# Patient Record
Sex: MALE | Race: WHITE
[De-identification: names, ages, dates, MRNs, and addresses within clinical notes are randomized per-mention and may not be internally consistent; named-entity substitution may affect disease eponyms.]

---

## 2018-06-09 ENCOUNTER — HOSPITAL ENCOUNTER (EMERGENCY)
Dept: HOSPITAL 17 - PHEFT | Age: 78
Discharge: HOME | End: 2018-06-09
Payer: MEDICARE

## 2018-06-09 VITALS
OXYGEN SATURATION: 97 % | RESPIRATION RATE: 18 BRPM | DIASTOLIC BLOOD PRESSURE: 78 MMHG | SYSTOLIC BLOOD PRESSURE: 140 MMHG | HEART RATE: 48 BPM

## 2018-06-09 VITALS — DIASTOLIC BLOOD PRESSURE: 78 MMHG | SYSTOLIC BLOOD PRESSURE: 134 MMHG

## 2018-06-09 VITALS — HEIGHT: 66 IN | BODY MASS INDEX: 22.36 KG/M2 | WEIGHT: 139.11 LBS

## 2018-06-09 VITALS
SYSTOLIC BLOOD PRESSURE: 150 MMHG | HEART RATE: 48 BPM | TEMPERATURE: 98.1 F | DIASTOLIC BLOOD PRESSURE: 114 MMHG | RESPIRATION RATE: 18 BRPM | OXYGEN SATURATION: 97 %

## 2018-06-09 DIAGNOSIS — I10: ICD-10-CM
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
## 2018-06-09 DIAGNOSIS — S05.01XA: Primary | ICD-10-CM

## 2018-06-09 DIAGNOSIS — H11.31: ICD-10-CM

## 2018-06-09 PROCEDURE — 99283 EMERGENCY DEPT VISIT LOW MDM: CPT

## 2018-06-09 NOTE — PD
HPI


Chief Complaint:  Eye Problems/Injury


Time Seen by Provider:  19:31


Travel History


International Travel<30 days:  No


Contact w/Intl Traveler<30days:  No


Traveled to known affect area:  No





History of Present Illness


HPI


78-year-old male presents to the emergency department with complaint of injury 

to his right eye from a branch that swung back, injuring his eye today.  

Reports redness to the lateral aspect.  Denies change in vision or eye pain.  

Denies eye drainage.  Has not taken any medications or try any treatments to 

alleviate his symptoms.  Symptoms are mild in severity.  No known aggravating 

or relieving factors.  No known allergies.  Primary CARE providers Dr. Lizarraga.  History of hypertension.  Has no other medical complaints.  No other 

modifying factors or associated signs and symptoms.





PFSH


Past Medical History


Hx Anticoagulant Therapy:  Yes


Cancer:  No


Cardiovascular Problems:  Yes


Diminished Hearing:  Yes (BILATERAL HEARING AIDS)


Endocrine:  No


Gastrointestinal Disorders:  Yes


Headaches:  Yes


Hiatal Hernia:  Yes


Hypertension:  Yes (BORDERLINE-NON MEDICATED)


Musculoskeletal:  Yes


Neurologic:  Yes


Reproductive:  No


Respiratory:  No


Tetanus Vaccination:  Unknown


Influenza Vaccination:  No


Pregnant?:  Not Pregnant





Past Surgical History


Abdominal Surgery:  Yes (HERNIA REPAIR)


Other Surgery:  Yes (HEMMORHOIDS)





Social History


Alcohol Use:  No


Tobacco Use:  No


Substance Use:  No





Allergies-Medications


(Allergen,Severity, Reaction):  


Coded Allergies:  


     No Known Allergies (Verified  Adverse Reaction, Unknown, 6/9/18)


Reported Meds & Prescriptions





Reported Meds & Active Scripts


Active


Polytrim Opth Drops (Polymyxin/Trimethoprim Sulfate) 10,000-0.1 Unit/Ml-% Soln 

1 Drop RIGHT EYE Q6HR 7 Days


Reported


Aspirin 81 Mg Chew 81 Mg CHEW DAILY


Losartan (Losartan Potassium) 25 Mg Tab 25 Mg PO DAILY








Review of Systems


Except as stated in HPI:  all other systems reviewed are Neg





Physical Exam


Narrative


GENERAL: Well-nourished, well-developed elderly, male patient, in no acute 

distress


SKIN: Warm and dry.


HEAD: Atraumatic. Normocephalic. 


EYES: Pupils equal and round at 3 mm with brisk reaction.   PERRLA.  EOMI. 

right lid eversion with no foreign body noted.  Right eye without scleral 

erythema and without lid edema.  Subconjunctival hemorrhage noted to the 

lateral aspect of the right eye.  no orbital tenderness, erythema or 

cellulitis.  Right eye without photophobia.  No consensual photophobia.   No 

scleral icterus.  No drainage.  Woods lamp exam reveals tiny, approximately 2 

mm corneal abrasion to the lateral aspect of the sclera of the right eye at the 

9 o'clock position. ENT: Mucosa pink and moist.  Airway patent.  


NECK: Trachea midline.  


CARDIOVASCULAR: Regular rate.


RESPIRATORY: No accessory muscle use. 


GASTROINTESTINAL: Flat.


NEUROLOGICAL: Awake and alert.  Oriented 3.  No obvious cranial nerve 

deficits.  Motor grossly within normal limits. Normal speech. 


PSYCHIATRIC: Appropriate mood and affect; insight and judgment normal.





Data


Data


Last Documented VS





Vital Signs








  Date Time  Temp Pulse Resp B/P (MAP) Pulse Ox O2 Delivery O2 Flow Rate FiO2


 


6/9/18 20:33        


 


6/9/18 20:11  48 18  97 Room Air  


 


6/9/18 19:05 98.1       








Orders





 Orders


Ed Discharge Order (6/9/18 20:25)








MDM


Medical Decision Making


Medical Screen Exam Complete:  Yes


Emergency Medical Condition:  Yes


Medical Record Reviewed:  Yes


Differential Diagnosis


Subconjunctival hemorrhage, corneal abrasion, contusion


Narrative Course


78-year-old male with a corneal abrasion and subconjunctival hemorrhage of the 

right eye.  Polytrim drops prescribed for home.  Patient to follow-up with 

ophthalmologist.  Instructed patient to follow up with primary care provider.  

Patient verbalizes understanding and agreement with treatment plan.  Patient is 

medically cleared and stable for discharge.  Discussed reasons to return to the 

emergency department.  Patient agrees with treatment plan.  The patients vital 

signs are stable and the patient is stable for outpatient follow-up and 

treatment.  Patient discharged home, stable and in no acute distress.





Diagnosis





 Primary Impression:  


 Corneal abrasion


 Qualified Codes:  S05.01XA - Injury of conjunctiva and corneal abrasion 

without foreign body, right eye, initial encounter


 Additional Impression:  


 Subconjunctival hemorrhage of right eye


Referrals:  


Ophthalmologist





Primary Care Physician


Patient Instructions:  Corneal Abrasion (ED), General Instructions, 

Subconjunctival Hemorrhage (ED)





***Additional Instructions:  


Ibuprofen or Tylenol as directed and as needed to reduce pain


Do not patch the eye


Do not rub the eye


Refrigerated eye drops as needed to reduce pain


Cool compresses to the eye as needed to reduce pain


Follow-up with ophthalmology


Primary care provider


Return to the emergency department immediately with worsening of symptoms


***Med/Other Pt SpecificInfo:  Prescription(s) given


Scripts


Polymyxin B-Trimethoprim Opth Drops (Polytrim Opth Drops) 10,000-0.1 Unit/Ml-% 

Soln


1 DROP RIGHT EYE Q6HR for Mgmt Bacterial Infection for 7 Days, #1 BOTTLE 0 

Refills


   Prov: Sangita Styles         6/9/18


Disposition:  01 DISCHARGE HOME


Condition:  Stable











Sangita Styles Jun 9, 2018 20:24